# Patient Record
Sex: MALE | Race: BLACK OR AFRICAN AMERICAN | URBAN - METROPOLITAN AREA
[De-identification: names, ages, dates, MRNs, and addresses within clinical notes are randomized per-mention and may not be internally consistent; named-entity substitution may affect disease eponyms.]

---

## 2019-06-01 ENCOUNTER — HOSPITAL ENCOUNTER (OUTPATIENT)
Dept: MEDSURG UNIT | Facility: HOSPITAL | Age: 39
End: 2019-06-02
Admitting: INTERNAL MEDICINE

## 2019-06-01 LAB
ABS NEUT (OLG): 9.18 X10(3)/MCL (ref 2.1–9.2)
ALBUMIN SERPL-MCNC: 3.8 GM/DL (ref 3.4–5)
ALBUMIN/GLOB SERPL: 0.9 {RATIO}
ALP SERPL-CCNC: 93 UNIT/L (ref 50–136)
ALT SERPL-CCNC: 36 UNIT/L (ref 12–78)
APTT PPP: 27 SECOND(S) (ref 24.2–33.9)
AST SERPL-CCNC: 43 UNIT/L (ref 15–37)
BASOPHILS # BLD AUTO: 0 X10(3)/MCL (ref 0–0.2)
BASOPHILS NFR BLD AUTO: 0 %
BILIRUB SERPL-MCNC: 0.6 MG/DL (ref 0.2–1)
BILIRUBIN DIRECT+TOT PNL SERPL-MCNC: 0.2 MG/DL (ref 0–0.2)
BILIRUBIN DIRECT+TOT PNL SERPL-MCNC: 0.4 MG/DL (ref 0–0.8)
BUN SERPL-MCNC: 15 MG/DL (ref 7–18)
CALCIUM SERPL-MCNC: 8.9 MG/DL (ref 8.5–10.1)
CHLORIDE SERPL-SCNC: 101 MMOL/L (ref 98–107)
CO2 SERPL-SCNC: 28 MMOL/L (ref 21–32)
CREAT SERPL-MCNC: 1.04 MG/DL (ref 0.7–1.3)
EOSINOPHIL # BLD AUTO: 0 X10(3)/MCL (ref 0–0.9)
EOSINOPHIL NFR BLD AUTO: 0 %
ERYTHROCYTE [DISTWIDTH] IN BLOOD BY AUTOMATED COUNT: 13.4 % (ref 11.5–17)
GLOBULIN SER-MCNC: 4.4 GM/DL (ref 2.4–3.5)
GLUCOSE SERPL-MCNC: 97 MG/DL (ref 74–106)
HCT VFR BLD AUTO: 43.2 % (ref 42–52)
HGB BLD-MCNC: 14 GM/DL (ref 14–18)
INR PPP: 1 (ref 0–1.3)
LYMPHOCYTES # BLD AUTO: 2.5 X10(3)/MCL (ref 0.6–4.6)
LYMPHOCYTES NFR BLD AUTO: 20 %
MCH RBC QN AUTO: 32 PG (ref 27–31)
MCHC RBC AUTO-ENTMCNC: 32.4 GM/DL (ref 33–36)
MCV RBC AUTO: 98.6 FL (ref 80–94)
MONOCYTES # BLD AUTO: 0.8 X10(3)/MCL (ref 0.1–1.3)
MONOCYTES NFR BLD AUTO: 6 %
NEUTROPHILS # BLD AUTO: 9.18 X10(3)/MCL (ref 2.1–9.2)
NEUTROPHILS NFR BLD AUTO: 73 %
PLATELET # BLD AUTO: 176 X10(3)/MCL (ref 130–400)
PMV BLD AUTO: 11.9 FL (ref 9.4–12.4)
POTASSIUM SERPL-SCNC: 4.1 MMOL/L (ref 3.5–5.1)
PROT SERPL-MCNC: 8.2 GM/DL (ref 6.4–8.2)
PROTHROMBIN TIME: 13.7 SECOND(S) (ref 12–14)
RBC # BLD AUTO: 4.38 X10(6)/MCL (ref 4.7–6.1)
SODIUM SERPL-SCNC: 134 MMOL/L (ref 136–145)
WBC # SPEC AUTO: 12.6 X10(3)/MCL (ref 4.5–11.5)

## 2019-06-02 LAB
BUN SERPL-MCNC: 11 MG/DL (ref 7–18)
CALCIUM SERPL-MCNC: 8.3 MG/DL (ref 8.5–10.1)
CHLORIDE SERPL-SCNC: 104 MMOL/L (ref 98–107)
CO2 SERPL-SCNC: 25 MMOL/L (ref 21–32)
CREAT SERPL-MCNC: 0.94 MG/DL (ref 0.7–1.3)
CREAT/UREA NIT SERPL: 11.7
ERYTHROCYTE [DISTWIDTH] IN BLOOD BY AUTOMATED COUNT: 13.1 % (ref 11.5–17)
GLUCOSE SERPL-MCNC: 74 MG/DL (ref 74–106)
HCT VFR BLD AUTO: 39.2 % (ref 42–52)
HGB BLD-MCNC: 12.7 GM/DL (ref 14–18)
MCH RBC QN AUTO: 32.1 PG (ref 27–31)
MCHC RBC AUTO-ENTMCNC: 32.4 GM/DL (ref 33–36)
MCV RBC AUTO: 99 FL (ref 80–94)
PLATELET # BLD AUTO: 159 X10(3)/MCL (ref 130–400)
PMV BLD AUTO: 12.8 FL (ref 9.4–12.4)
POTASSIUM SERPL-SCNC: 4.2 MMOL/L (ref 3.5–5.1)
RBC # BLD AUTO: 3.96 X10(6)/MCL (ref 4.7–6.1)
SODIUM SERPL-SCNC: 136 MMOL/L (ref 136–145)
WBC # SPEC AUTO: 8.2 X10(3)/MCL (ref 4.5–11.5)

## 2022-04-30 NOTE — H&P
Patient:   Noman Wu            MRN: [id number]            FIN: [id number]               Age:   38 years     Sex:  Male     :  1980   Associated Diagnoses:   None   Author:   Alfredo Tapia MD      Basic Information   Source of history:  Self, Medical record.    Present at bedside:  Family member, Medical personnel.    Referral source:  Emergency department.    History limitation:  None.    Provider information/ cc:    PCP: NONE  CODE STATUS: FULL  ADVANCED DIRECTIVES: NONE.       Chief Complaint   2019 10:29 CDT       patient transferred from [facility]. Dx with Mastoiditis      History of Present Illness   38 -year-old -American male presents to the ED transferred from [facility] in [place] via EMS for ENT services secondary to suspected mastoiditis.  She reports having right ear pain which began about 3 days prior to arrival in the ED.  He reports the pain worsened with associated redness and poor left drainage from his right ear which she presented to [facility] ED for further evaluation.  Workup was done at [facility] which CT scan was reported to reveal right sided mastoiditis.  No ENT services were available this patient was transferred to [facility] for ENT services. Pt also reports cough, denies any shortness of breath. He smokes about 1 pack of cigarettes a day. Sodium 134, WBCs 12.6, H&H 14.0/43.2, platelets 176; other indices unremarkable.  Patient was started on Unasyn therapy at [facility] which has been continued during this stay.  ENT services have been consulted.  Patient is admitted to hospital medicine services for further management.  VS /72 pulse 50 respirations 20 temperature 36.9°C O2 saturation 99% on room air      Review of Systems   Except as document, all other systems reviewed and negative      Health Status   Allergies:    Allergic Reactions (Selected)  No Known Allergies   Current medications:   (Selected)   Inpatient Medications  Ordered  Norco 5 mg-325 mg oral tablet: 1 tab(s), form: Tab, Oral, q6hr PRN for pain, severe, first dose 06/01/19 14:48:00 CDT, Waste Code MASOUD  Normal Saline (0.9% NS) IV 1,000 mL: 1,000 mL, 1,000 mL, IV, 100 mL/hr, start date 06/01/19 11:47:00 CDT  Unasyn: 1.5 gm, IV Piggyback, q6hr, Infuse over: 30 minute(s), first dose 06/01/19 12:00:00 CDT, Routine  Zofran: 4 mg, form: Injection, IV Push, q4hr PRN for nausea, first dose 06/01/19 11:45:00 CDT, STAT, choose first if ordered with other treatments for nausea  ibuprofen 400 mg oral tablet: 400 mg, form: Tab, Oral, q6hr PRN for pain, mild, first dose 06/01/19 14:48:00 CDT  nicotine 14 mg/24 hr transdermal film, extended release: 14 mg, form: Patch-24, TD, Daily, first dose 06/02/19 9:00:00 CDT, ***** WASTE SORT CODE:  PBKC *****      Histories   Past Medical History:   Denies past medical history   Family History:   Reviewed and negative to present medical condition   Procedure history:   Negative denies any prior surgery   Social History     Drinks alcohol socially  Denies any illicit drug use  Smokes one pack a cigarettes a day  Lives with family.        Physical Examination      Vital Signs (last 24 hrs)_____  Last Charted___________  Temp Oral     36.8 DegC  (TRISH 01 15:20)  Heart Rate Peripheral   L 43bpm  (TRISH 01 15:20)  Resp Rate         18 br/min  (TRISH 01 15:20)  SBP      137 mmHg  (TRISH 01 15:20)  DBP      78 mmHg  (TRISH 01 15:20)  SpO2      99 %  (TRISH 01 15:20)   General:  Alert and oriented, Mild distress, Appears stated age, complaints of right facial hair pain; nontoxic.    Eye:  Pupils are equal, round and reactive to light, Extraocular movements are intact, Normal conjunctiva, Vision unchanged.    HENT:  Normocephalic, Oral mucosa is moist, Right earlobe with anterior and posterior swelling and erythema noted extended along jawline,  Tenderness on palpation.    Neck:  Supple, Non-tender.    Respiratory:  Lungs are clear  to auscultation, Respirations are non-labored, Breath sounds are equal, Symmetrical chest wall expansion.    Cardiovascular:  Normal rate, Regular rhythm, S1, S2, No gallop, Normal peripheral perfusion.         Capillary refill: Less than 2 seconds.    Gastrointestinal:  Soft, Non-tender, Non-distended, Normal bowel sounds.    Genitourinary:  No costovertebral angle tenderness.    Musculoskeletal:  Normal range of motion, Normal strength, No tenderness, No swelling.    Integumentary:  Warm, Dry.    Neurologic:  Alert, Oriented, Normal sensory, Normal motor function, No focal deficits, Cranial Nerves II-XII are grossly intact.    Psychiatric:  Cooperative, Appropriate mood & affect.    Cognition and Speech:  Speech clear and coherent.       Review / Management   Results review:     Labs (Last four charted values)  WBC                  H 12.6 (JUN 01)   Hgb                  14.0 (JUN 01)   Hct                  43.2 (JUN 01)   Plt                  176 (JUN 01)   Na                   L 134 (JUN 01)   K                    4.1 (JUN 01)   CO2                  28.0 (JUN 01)   Cl                   101 (JUN 01)   Cr                   1.04 (JUN 01)   BUN                  15.0 (JUN 01)   Glucose Random       97 (JUN 01)   PT                   13.7 (JUN 01)   INR                  1.0 (JUN 01)   PTT                  27.0 (JUN 01) .    Laboratory Results   Today's Lab Results : PowerNote Discrete Results   6/1/2019 11:46 CDT       WBC                       12.6 x10(3)/mcL  HI                             RBC                       4.38 x10(6)/mcL  LOW                             Hgb                       14.0 gm/dL                             Hct                       43.2 %                             Platelet                  176 x10(3)/mcL                             MCV                       98.6 fL  HI                             MCH                       32.0 pg  HI                             MCHC                      32.4  gm/dL  LOW                             RDW                       13.4 %                             MPV                       11.9 fL                             Abs Neut                  9.18 x10(3)/mcL                             Neutro Auto               73 %  NA                             Lymph Auto                20 %  NA                             Mono Auto                 6 %  NA                             Eos Auto                  0 %  NA                             Abs Eos                   0.0 x10(3)/mcL                             Basophil Auto             0 %  NA                             Abs Neutro                9.18 x10(3)/mcL                             Abs Lymph                 2.5 x10(3)/mcL                             Abs Mono                  0.8 x10(3)/mcL                             Abs Baso                  0.0 x10(3)/mcL                             PT                        13.7 second(s)                             INR                       1.0                             PTT                       27.0 second(s)                             Sodium Lvl                134 mmol/L  LOW                             Potassium Lvl             4.1 mmol/L                             Chloride                  101 mmol/L                             CO2                       28.0 mmol/L                             Calcium Lvl               8.9 mg/dL                             Glucose Lvl               97 mg/dL                             BUN                       15.0 mg/dL                             Creatinine                1.04 mg/dL                             eGFR-AA                   >60 mL/min/1.73 m2  NA                             eGFR-DESIREE                  >60 mL/min/1.73 m2  NA                             Bili Total                0.6 mg/dL                             Bili Direct               0.20 mg/dL                             Bili Indirect             0.40 mg/dL                              AST                       43 unit/L  HI                             ALT                       36 unit/L                             Alk Phos                  93 unit/L                             Total Protein             8.2 gm/dL                             Albumin Lvl               3.80 gm/dL                             Globulin                  4.40 gm/dL  HI                             A/G Ratio                 0.9  NA        Documentation reviewed:  Reviewed prior records, Reviewed home medications.    Condition:  Fair.       Impression and Plan   Diagnosis       IMPRESSION:  Right ear cellulitis  Right facial cellulitis  Right facial pain  Tobacco abuse    PLAN:  ENT services have been consulted.  Continue with IV hydration.  Continue with IV anabiotic therapy.  GI/DVT prophylaxis.  Nicotine patch daily.  Will await further recommendations and evaluation from ENT services.        FULL CODE STATUS  GI/DVT PROPHYLAXIS      I, Bob FLORES, FNP, discussed the case with Dr. Alfredo Tapia..     Orders     Orders   Laboratory:  BMP (Order): Routine collect, 6/2/2019 5:00 CDT, Blood, Lab Collect, 6/2/2019 5:00 CDT  CBC wo/Diff (Order): Routine collect, 6/2/2019 5:00 CDT, Blood, Lab Collect, 6/2/2019 5:00 CDT.     Education and Follow-up:       Counseled: Patient, Family, Regarding diagnosis, Regarding treatment, Regarding medications.

## 2022-04-30 NOTE — ED PROVIDER NOTES
Patient:   Noman Wu            MRN: [id number]            FIN: [id number]               Age:   38 years     Sex:  Male     :  1980   Associated Diagnoses:   Mastoiditis of right side   Author:   Florencio Valdivia MD      Basic Information   Time seen: Date & time 2019 10:34:00.   History source: Patient.   Arrival mode: Ambulance.   History limitation: None.   Additional information: Chief Complaint from Nursing Triage Note : Chief Complaint   2019 10:29 CDT       Chief Complaint           patient transferred from [facility]. Dx with Mastoiditis  .      History of Present Illness   The patient presents with 39 y/o male transfer from [facility] dx with mastoiditis. suman andrews and         I, Dr. Valdivia, assumed care of this patient upon entering the room at 1108.  39 y/o AAM presents to the ED via EMS sent as a transfer from [facility] for ENT services being CT showed right sided mastoiditis. Pt reports this right ear pain began 3 days ago. Pt reports lots of pus drainage last night (5.31.19). .  The onset was 3  days ago.  The course/duration of symptoms is constant.  Location: Right ear. Radiating pain: none. The character of symptoms is achy.  The degree at onset was moderate.  The degree at maximum was moderate.  The degree at present is moderate.  The exacerbating factor is none.  The relieving factor is none.  Risk factors consist of none.  Prior episodes: none.  Therapy today: emergency medical services and transferred from another facility ([facility]).  Preceding symptoms: none.  Associated symptoms: none.        Review of Systems   Constitutional symptoms:  No fever, no chills.    Skin symptoms:  Negative except as documented in HPI.   ENMT symptoms:  right ear pain .   Respiratory symptoms:  No shortness of breath, no cough, no sputum production.    Cardiovascular symptoms:  No chest pain, no palpitations.     Gastrointestinal symptoms:  No abdominal pain, no nausea, no vomiting.    Psychiatric symptoms:  Negative except as documented in HPI.             Additional review of systems information: All other systems reviewed and otherwise negative.      Health Status   Allergies: No known allergies.   Medications:  (Selected)   Inpatient Medications  Ordered  Normal Saline (0.9% NS) IV 1,000 mL: 1,000 mL, 1,000 mL, IV, 100 mL/hr, start date 06/01/19 11:47:00 CDT  Unasyn: 1.5 gm, IV Piggyback, q6hr, Infuse over: 30 minute(s), first dose 06/01/19 12:00:00 CDT, Routine  Zofran: 4 mg, form: Injection, IV Push, q4hr PRN for nausea, first dose 06/01/19 11:45:00 CDT, STAT, choose first if ordered with other treatments for nausea.      Past Medical/ Family/ Social History   Medical history: Negative.   Surgical history: Negative.   Family history: Not significant.   Social history.   Physical Examination               Vital Signs   Vital Signs   6/1/2019 10:29 CDT       Temperature Oral          36.5 DegC                             Temperature Oral (calculated)             97.70 DegF                             Peripheral Pulse Rate     47 bpm  LOW                             Respiratory Rate          16 br/min                             SpO2                      98 %                             Oxygen Therapy            Room air                             Systolic Blood Pressure   122 mmHg                             Diastolic Blood Pressure  72 mmHg  .      Vital Signs (last 24 hrs)_____  Last Charted___________  Temp Oral     36.5 DegC  (TRISH 01 10:29)  Heart Rate Peripheral   L 47bpm  (TRISH 01 10:29)  Resp Rate         16 br/min  (TRISH 01 10:29)  SBP      122 mmHg  (TRISH 01 10:29)  DBP      72 mmHg  (TRISH 01 10:29)  SpO2      98 %  (TRISH 01 10:29).   Measurements   6/1/2019 10:29 CDT       Weight Dosing             72.5 kg                             Weight Measured and Calculated in Lbs     159.83 lb                              Weight Estimated          72.5 kg                             Height/Length Dosing      170 cm                             Height/Length Estimated   170 cm                             Body Mass Index Estimated 25.09 kg/m2  .   Basic Oxygen Information   6/1/2019 10:29 CDT       SpO2                      98 %                             Oxygen Therapy            Room air  .   General:  Alert, no acute distress.    Skin:  Warm, dry.    Head:  Normocephalic.   Neck:  Supple.   Eye:  Pupils are equal, round and reactive to light, extraocular movements are intact.    Ears, nose, mouth and throat:  External ear: significant swelling noted to the lower portion of the right ear that extends into the jaw anteriorly .   Cardiovascular:  Regular rate and rhythm, Normal peripheral perfusion.    Respiratory:  Lungs are clear to auscultation, respirations are non-labored.    Chest wall:  No tenderness.   Back:  Nontender, Normal range of motion.    Musculoskeletal:  Normal ROM.   Gastrointestinal:  Soft, Nontender.    Neurological:  Alert and oriented to person, place, time, and situation, normal speech observed.    Psychiatric:  Cooperative, appropriate mood & affect.       Medical Decision Making   Differential Diagnosis:  Mastoiditis.   Documents reviewed:  Emergency department nurses' notes.   Orders  Laboratory    CBC w/ Auto Diff, Florencio Valdivia MD, 06/01/19, 11:41, Ordered    PTT, Florencio Validvia MD, 06/01/19, 11:41, Ordered    INR - Protime, Florencio Valdivia MD, 06/01/19, 11:41, Ordered    CMP, Florencio Valdivia MD, 06/01/19, 11:41, Ordered    Automated Diff, Florencio Valdivia MD, 06/01/19, 11:46, Ordered  Xray    XR Historical, Florencio Valdivia MD, 06/01/19, 10:56, Completed  CT / MRI / Ultrasound    CT Historical, Florencio Valdivia MD, 06/01/19, 10:56, Completed.   Results review:     No qualifying data available.   Radiology results:  Computed tomography, interpretation:  CT from previous location shows opacification of some of the  right mastoid air cells with fluid. possibility of changes of right mastoiditis cannot be excluded..       Reexamination/ Reevaluation   Time: 6/1/2019 11:58:00 .   Vital signs   Basic Oxygen Information   6/1/2019 10:29 CDT       SpO2                      98 %                             Oxygen Therapy            Room air     Course: progressing as expected.      Impression and Plan   Diagnosis   Mastoiditis of right side ([id number] H70.91)   Plan   Disposition: Admit time  6/1/2019 11:40:00, Place in Observation Unit, Alfredo Tapia MD.    Counseled: Patient, Regarding diagnosis, Regarding diagnostic results, Regarding treatment plan, Patient indicated understanding of instructions.    Orders: I have personally seen and examined the patient and agree with the scribes documentation..    Notes: I, Camille Contreras, acted solely as a scribe for and in the presence of Dr. Valdivia who performed the service..

## 2022-05-02 NOTE — HISTORICAL [facility]
This is a historical note converted from Sarath. Formatting and pictures may have been removed.  Please reference Cerban for original formatting and attached multimedia. Admit and Discharge Dates  Admit Date: 06/01/2019  Discharge Date: 06/02/2019  ?  Physicians  Attending Physician - Regina HOLLIS, Alfredo  Attending Physician - Sulaiman HOLLIS, Lan MCDOWELL  Admitting Physician - Regina HOLLIS, Alfredo  Consulting Physician - Ruben HOLLIS, Luis F LEE  Primary Care Physician - No PCP, No  ?  Discharge Diagnosis  1.?Sebaceous cyst?L72.3  ??right earlobe  2.?Tobacco abuse?Z72.0  Hospital Course  ??38 -year-old -American male presents to the ED transferred from [facility] in [place] via EMS for ENT services secondary to suspected mastoiditis.??He reports having right ear pain which began about 3 days prior to arrival in the ED. ?He reports the pain worsened with associated?erythema and purulent drainage from his right earlobe for?which he presented to [facility] ED for further evaluation. ?Workup was done at [facility]?and CT scan was reported to reveal right sided mastoiditis. ?No ENT services were available so this patient was transferred to [facility] for ENT services. Pt also reports cough, denies any shortness of breath. He smokes about 1 pack of cigarettes a day. Sodium 134, WBCs 12.6, H&H 14.0/43.2, platelets 176; other indices unremarkable. ?Patient was started on Unasyn therapy at [facility] which has been continued during this stay. ?ENT?was consulted. ?Patient was very promptly admitted to hospital medicine services for further management.? Pt was evaluated by ENT who did not feel that the patient had mastoiditis and, at the most, a sebaceous cyst of the right earlobe.? He felt that this was an inappropriate transfer.? The patient was discharged home.  Time Spent on discharge  45 minutes  Objective  Physical Exam  ??General: ?Alert and oriented, Mild distress,?Appears stated age, complaints of  right facial hair pain; nontoxic. ?  ??Eye: ?Pupils are equal, round and reactive to light, Extraocular movements are intact, Normal conjunctiva, Vision unchanged. ?  ??HENT: ?Normocephalic, Oral mucosa is moist,?Right lobule?and postauricular area with mild edema.? No obvious fluctuance.? No active oozing or purulence.  ??Neck: ?Supple, Non-tender. ?  ??Respiratory: ?Lungs are clear to auscultation, Respirations are non-labored, Breath sounds are equal, Symmetrical chest wall expansion. ?  ??Cardiovascular: ?Normal rate, Regular rhythm, S1, S2, No gallop, Normal peripheral perfusion. ?  ???? ? Capillary refill: Less than 2 seconds. ?  ??Gastrointestinal: ?Soft, Non-tender, Non-distended, Normal bowel sounds. ?  ??Genitourinary: ?No costovertebral angle tenderness. ?  ??Musculoskeletal: ?Normal range of motion, Normal strength, No tenderness, No swelling. ?  ??Integumentary: ?Warm, Dry. ?  ??Neurologic: ?Alert, Oriented, Normal sensory, Normal motor function, No focal deficits, Cranial Nerves II-XII are grossly intact. ?  ??Psychiatric: ?Cooperative, Appropriate mood & affect. ?  ??Cognition and Speech: ?Speech clear and coherent.  Patient Discharge Condition  stable  Discharge Disposition  home   Discharge Medication Reconciliation  Prescribed  doxycycline (doxycycline hyclate 100 mg oral capsule)?100 mg, Oral, BID  ?  Education and Orders Provided  Contact Dermatitis, Easy-to-Read  Epidermal Cyst  Discharge - 06/02/19 10:50:00 CDT, Home?  Discharge Activity - Activity as Tolerated?  Discharge Diet - Regular?  ?     [1]?Hospitalist General Admission H&P; Alfredo Tapia MD 06/01/2019 15:15 CDT

## 2022-05-02 NOTE — HISTORICAL [facility]
This is a historical note converted from Cerban. Formatting and pictures may have been removed.  Please reference Sarath for original formatting and attached multimedia. Chief Complaint  patient transferred from [facility]. Dx with Mastoiditis  Reason for Consultation  ? ?Mastoiditis  History of Present Illness  38-year-old male transferred from [facility].? The transfer was for possible mastoiditis.? Review of the CT scan reveals normal-appearing mastoids.? Both middle ears are well aerated.? The patient does have some mild?right earlobe and facial swelling.  Patient reports getting pus from the earlobe?earlier this morning  His symptoms have improved since presenting to the ER  Review of Systems  Constitutional_negative  Eye_no vision changes  ENMT see HPI  Respiratory negative  Cardiovascular negative  Gastrointestinal negative  Hema/Lymph_negative  Endocrine negative  Immunologic negative  Musculoskeletal negative  Integumentary negative  Neurologic negative  All Other ROS_ negative  Physical Exam  Vitals & Measurements  T:?36.8? ?C (Oral)? TMIN:?36.5? ?C (Oral)? TMAX:?36.8? ?C (Oral)? HR:?43(Peripheral)? RR:?18? BP:?137/78? SpO2:?99%? WT:?72.5?kg?  General_NAD, normocephalic and atraumatic, normal voice  Eye_EOMI, PERRL  Ear Right TM normal, Left TM normal, normal pinnas and EACs.  Right lobule?and postauricular area with mild edema.? No obvious fluctuance.? No active oozing of purulence.  Nose - Inferior turbinates normal, septum midline, mucosa moist  Oral cavity - Tongue normal, no mucoasal lesions, good dentition  Oropharynx - no lesions noted  Neck- no lymphadenopathy, no thyromegaly  Respiratory- no increased work of breathing  Integumentary- no rashes, no skin lesions  Neurologic- CN II-XII intact  Assessment/Plan  Head pain?[id number]  Mastoiditis of right side?H70.91  Transfer?[id number]  Orders:  Regular Diet,  06/01/19 14:00:00 CDT, Start Meal: Now  ?  Inappropriate transfer from outside hospital  Patient does not have mastoiditis.? He appears to have dermatitis?and perhaps?small infected sebaceous cyst of the right earlobe  The patient expressed purulence from the earlobe himself.? And is now much better  Okay to discharge from ENT perspective   Problem List/Past Medical History  Ongoing  No qualifying data  Historical  No qualifying data  Medications  Inpatient  ibuprofen 400 mg oral tablet, 400 mg= 1 tab(s), Oral, q6hr, PRN  nicotine 14 mg/24 hr transdermal film, extended release, 14 mg= 1 patch(es), TD, Daily  Norco 5 mg-325 mg oral tablet, 1 tab(s), Oral, q6hr, PRN  Normal Saline (0.9% NS) IV 1,000 mL, 1000 mL, IV  Unasyn  Zofran, 4 mg= 2 mL, IV Push, q4hr, PRN  Home  No active home medications  Allergies  No Known Allergies  Social History  Tobacco  10 or more cigarettes (1/2 pack or more)/day in last 30 days, No, 06/01/2019